# Patient Record
(demographics unavailable — no encounter records)

---

## 2025-02-24 NOTE — DISCUSSION/SUMMARY
[FreeTextEntry1] : Continue with simvastatin 20 mg QHS. Patient was instructed to target her T. Cholesterol to less than 200 mg/dl and LDL cholesterol to less than 100 mg/dl. She is at target goal with respect to her lipid levels. EKG: NSR rate of 85 bpm, nonspecific ST T changes. Treadmill stress test results were negative for myocardial ischemia TTE results were reviewed with the patient and revealed normal LV systolic function Exercise and weight loss was advised. Patient was advised to repeat a BMP, CBC , fasting lipid profile and hepatic panel. RV in 6 months.  [EKG obtained to assist in diagnosis and management of assessed problem(s)] : EKG obtained to assist in diagnosis and management of assessed problem(s)

## 2025-02-24 NOTE — ASSESSMENT
[FreeTextEntry1] : Hyperlipidemia Elevated Coronary calcium score placing the patient in 70 percentile of age and gender (Agatston score) R/O ASHD

## 2025-02-24 NOTE — REASON FOR VISIT
[FreeTextEntry1] : Pleasant 66 year old Female presents for Cardiology consultation due to hyperlipidemia and a review of her recent lab results.

## 2025-02-24 NOTE — HISTORY OF PRESENT ILLNESS
[FreeTextEntry1] : Hyperlipidemia Patient denies a history of MI, angina, CHF, arrhythmia, TIA, CVA, syncope, DM, Hypertension. History of left ventricular enlargement. History of negative exercise stress testing in 2011, 2015 and 2020.